# Patient Record
(demographics unavailable — no encounter records)

---

## 2024-10-17 NOTE — HEALTH RISK ASSESSMENT
[Little interest or pleasure doing things] : 1) Little interest or pleasure doing things [2] : 1) Little interest or pleasure doing things for more than half of the days (2) [Feeling down, depressed, or hopeless] : 2) Feeling down, depressed, or hopeless [3] : 2) Feeling down, depressed, or hopeless for nearly every day (3) [PHQ-2 Positive] : PHQ-2 Positive [Nearly Every Day (3)] : 6.) Feeling bad about yourself, or that you are a failure, or have let yourself or your family down? Nearly every day [1/2 of Days or More (2)] : 8.) Moving or speaking so slowly that other people could have noticed, or the opposite, moving or speaking faster than usual? Half the days or more [Not at All (0)] : 9.) Thoughts that you would be off dead or of hurting yourself in some way? Not at all [Moderately Severe] : Severity of Depression is Moderately Severe [Very Difficult] : How difficult have these problems made it for you to do your work, take care of things at home, or get along with people? Very difficult [PHQ-9 Positive] : PHQ-9 Positive [I have developed a follow-up plan documented below in the note.] : I have developed a follow-up plan documented below in the note. [Time Spent: ___ Minutes] : I spent [unfilled] minutes performing a depression screening for this patient. [de-identified] : none [de-identified] : Vascular [de-identified] : pt is not active [de-identified] : fair, no carbs [OAS8Vtrby] : 5 [[id number]] : 19

## 2024-10-17 NOTE — HISTORY OF PRESENT ILLNESS
[de-identified] : 48 F with hypothyroidism and MDD presenting for an acute visit. Noted to have positive PHQ-2. The source of her depression is because she has had to take care of her sick father who has Parkinson's disease. She states she stopped taking levothyroxine because it makes her feel sick and has not been taking sertraline for months (previously taking 50mg daily). She is reporting RUQ pain today. Never went for US abdomen as recommended multiple times.

## 2024-10-17 NOTE — PHYSICAL EXAM
[Normal] : no acute distress, well nourished, well developed and well-appearing [Normal Sclera/Conjunctiva] : normal sclera/conjunctiva [No Respiratory Distress] : no respiratory distress  [de-identified] : TTP in the RUQ [de-identified] : Tearful

## 2024-11-20 NOTE — HISTORY OF PRESENT ILLNESS
[FreeTextEntry1] : spot under left eye  [de-identified] : spot under left eye growing gets irritated   also has small growths around neck, under axilla discoloration on cheeks

## 2024-11-20 NOTE — PHYSICAL EXAM
[FreeTextEntry3] : hyperpigmentation on cheeks brown pedunculated papule under left eye small skin colored papules under arm, around neck

## 2024-11-20 NOTE — ASSESSMENT
[FreeTextEntry1] : Favor SK/large DPN under left eye - referral to plastic surgery (Dr. Mckeon) for removal  other DPNs/skin tags - benign, reassurance, no intervention needed unless irritated  Favor subtle melasma vs early lentigo, cheeks at this point, recommend monitoring as skin lightening creams with hydroquinone may be too irritating if worsening, rtc

## 2024-12-04 NOTE — HEALTH RISK ASSESSMENT
[No] : In the past 12 months have you used drugs other than those required for medical reasons? No [No falls in past year] : Patient reported no falls in the past year [Little interest or pleasure doing things] : 1) Little interest or pleasure doing things [Feeling down, depressed, or hopeless] : 2) Feeling down, depressed, or hopeless [3] : 2) Feeling down, depressed, or hopeless for nearly every day (3) [PHQ-2 Positive] : PHQ-2 Positive [Never] : Never [PHQ-9 Positive] : PHQ-9 Positive [I have developed a follow-up plan documented below in the note.] : I have developed a follow-up plan documented below in the note. [Time Spent: ___ Minutes] : I spent [unfilled] minutes performing a depression screening for this patient. [de-identified] : no [de-identified] : dermatoligist  [de-identified] : poor  [de-identified] : regular  [[id number]] : 6

## 2024-12-04 NOTE — HISTORY OF PRESENT ILLNESS
[FreeTextEntry1] : pt is here for follow up and forms  [de-identified] : 48 F with hypothyroidism and MDD is presenting today for a follow-up visit.  She went for an MRI of the abdomen in November which shows hepatic steatosis in the setting of elevated LFTs.  She is compliant with sertraline 50 mg daily.  PHQ-9 score today is a 10 which is down from 19 last visit.  She does not take levothyroxine despite having hypothyroidism because of side effects. He also reports having a fall a couple of days ago with residual back pain.

## 2025-01-15 NOTE — PHYSICAL EXAM
[TextEntry] : GENERAL: awake, NAD   HEENT: NCAT   NECK: supple, no LAD    CARDIAC: RRR, S1, S2 present   LUNGS: CTA b/l, comfortable respirations on room air   ABD: Soft, NT, ND   EXT: warm, well-perfused, no edema   SKIN: No lesions noted. Dry and warm

## 2025-01-15 NOTE — REVIEW OF SYSTEMS
[TextEntry] : Constitutional, Cardiovascular, Respiratory, Musculoskeletal, Integumentary, Neurological and Heme/Lymph review of systems are otherwise negative except as noted in HPI.

## 2025-01-15 NOTE — HISTORY OF PRESENT ILLNESS
[FreeTextEntry1] : : [id number]  Patient is a 48F with hypothyroidism and MDD, hepatic steatosis, here with initial evaluation for hypothyroidism.  Patient states back in 2001, was told she had her thyroid level in abnormal range. Patient was prescribed LT4 112 mcg qAM but could not tolerate for past years. States she gets exacerbation of emotional lability, anxiety and weight gain. Currently pt is on sertraline for depression and has been helping. Patient otherwise reports clinically euthyroid today.  Was not compliant with LT4 in Sept 2024 when TSH was normal as below. Currently denies taking any medications. Past 8 months patient has no period. Denies hot flashes.  Labs 9/2024- CMP with AST56, ALT 99, . Normal eGFR, TSH 3.69, T3 104 A1c today 5.6%  SHx: Denies toxic habits FHx: No thyroid issues in family

## 2025-01-15 NOTE — ASSESSMENT
[FreeTextEntry1] : Patient is a 48F with hypothyroidism and MDD, hepatic steatosis, here with initial evaluation for hypothyroidism.  # Hypothyroidism - Known thyroid issues in the past, from pregnancy back early 2000 - Reportedly could not tolerate LT4 due to emotional lability and weight gain - Explained that, correcting hypothyroidism could potentially help with weight gain rather, though effect is minimal. Also no evidence about direct emotional impact on LT4, though person to person variability exists - Clinically euthyroid and TSH 3.69 in 9/2024 while not compliant with LT4 - Will check TSH and TPO Ab today. If TSH elevated, may resume LT4 but at a lower dose. If normal TSH, no further endocrine follow-up needed.  # Prediabetes - HbA1c 5.6% today, improved since last year - Advised healthy lifestyle adherence. Pt to follow with pcp  RTC 4 months

## 2025-02-28 NOTE — REVIEW OF SYSTEMS
[Recent Change In Weight] : ~T recent weight change [Joint Pain] : joint pain [Skin Rash] : skin rash [Dizziness] : dizziness [Negative] : Heme/Lymph

## 2025-02-28 NOTE — PLAN
[FreeTextEntry1] :  #HCM - CBC with differential, HIV, Hep C, CMP, hemoglobin A1c, vitamin B12, vitamin D, urine analysis, lipid panel, TFTs all ordered. Patient to be informed of results. - The patient has screened negative for depression and excessive alcohol use. - The patient has never used tobacco products. - The [place] Preventive Service Task Force recommends yearly lung cancer screening with LDCT for people who have a 20 pack-year or more smoking history and smoke now or have quit within the past 15 years and are between 50 and 80 years old. - Blood pressure as taken in the office today is within normal limits (<120/<80). - Colon cancer screening was discussed at today's visit, reports normal study Aug 2024 - EKG performed in the office today is within normal limits. - Breast cancer screening has been discussed at today's visit and mammogram script has been ordered. - Counseled on maintaining a healthy body weight. It has been estimated that up to one-third of cardiovascular disease deaths may be preventable by healthy diet and physical activity.

## 2025-02-28 NOTE — HEALTH RISK ASSESSMENT
[Good] : ~his/her~  mood as  good [No] : No [No falls in past year] : Patient reported no falls in the past year [0] : 2) Feeling down, depressed, or hopeless: Not at all (0) [Never] : Never [NO] : No [Patient reported mammogram was normal] : Patient reported mammogram was normal [Patient reported PAP Smear was normal] : Patient reported PAP Smear was normal [Patient reported colonoscopy was normal] : Patient reported colonoscopy was normal [HIV Test offered] : HIV Test offered [Hepatitis C test offered] : Hepatitis C test offered [With Family] : lives with family [Employed] : employed [] :  [# Of Children ___] : has [unfilled] children [Sexually Active] : sexually active [Feels Safe at Home] : Feels safe at home [Fully functional (bathing, dressing, toileting, transferring, walking, feeding)] : Fully functional (bathing, dressing, toileting, transferring, walking, feeding) [Fully functional (using the telephone, shopping, preparing meals, housekeeping, doing laundry, using] : Fully functional and needs no help or supervision to perform IADLs (using the telephone, shopping, preparing meals, housekeeping, doing laundry, using transportation, managing medications and managing finances) [Smoke Detector] : smoke detector [Carbon Monoxide Detector] : carbon monoxide detector [High Risk Behavior] : no high risk behavior [Sunscreen] : does not use sunscreen [FreeTextEntry1] : Physical and weight management  [PHQ-2 Negative - No further assessment needed] : PHQ-2 Negative - No further assessment needed [de-identified] : None [de-identified] : None [de-identified] : normal [de-identified] : no diet [[id number]] : 0 [Change in mental status noted] : No change in mental status noted [Language] : denies difficulty with language [Behavior] : denies difficulty with behavior [Learning/Retaining New Information] : denies difficulty learning/retaining new information [Handling Complex Tasks] : denies difficulty handling complex tasks [Reasoning] : denies difficulty with reasoning [Spatial Ability and Orientation] : denies difficulty with spatial ability and orientation [Reports changes in hearing] : Reports no changes in hearing [Reports changes in vision] : Reports no changes in vision [Reports normal functional visual acuity (ie: able to read med bottle)] : Reports poor functional visual acuity.  [Reports changes in dental health] : Reports no changes in dental health [Safety elements used in home] : no safety elements used in home [Seat Belt] : does not use seat belt [Travel to Developing Areas] : does not  travel to developing areas [TB Exposure] : is not being exposed to tuberculosis [Caregiver Concerns] : does not have caregiver concerns [MammogramDate] : 01/2024 [PapSmearDate] : 01/2023 [ColonoscopyDate] : 01/2024

## 2025-02-28 NOTE — HISTORY OF PRESENT ILLNESS
[de-identified] : 48 F with hypothyroidism and MDD is presenting today for physical examination and acute visit. She has a multitude of complaints today including worsening vertigo symptoms, right leg heaviness as well as right knee and right hip pain, rash on her lower extremities and wanting to lose weight.  She states the vertigo last for a couple of seconds and there is a room spinning sensation.  She states the right knee and right hip bother her when she climbs stairs.  The rash is not itchy or painful however is only in the lower extremities.  She also wants to lose weight.

## 2025-02-28 NOTE — HEALTH RISK ASSESSMENT
[Good] : ~his/her~  mood as  good [No] : No [No falls in past year] : Patient reported no falls in the past year [0] : 2) Feeling down, depressed, or hopeless: Not at all (0) [Never] : Never [NO] : No [Patient reported mammogram was normal] : Patient reported mammogram was normal [Patient reported PAP Smear was normal] : Patient reported PAP Smear was normal [Patient reported colonoscopy was normal] : Patient reported colonoscopy was normal [HIV Test offered] : HIV Test offered [Hepatitis C test offered] : Hepatitis C test offered [With Family] : lives with family [Employed] : employed [] :  [# Of Children ___] : has [unfilled] children [Sexually Active] : sexually active [Feels Safe at Home] : Feels safe at home [Fully functional (bathing, dressing, toileting, transferring, walking, feeding)] : Fully functional (bathing, dressing, toileting, transferring, walking, feeding) [Fully functional (using the telephone, shopping, preparing meals, housekeeping, doing laundry, using] : Fully functional and needs no help or supervision to perform IADLs (using the telephone, shopping, preparing meals, housekeeping, doing laundry, using transportation, managing medications and managing finances) [Smoke Detector] : smoke detector [Carbon Monoxide Detector] : carbon monoxide detector [High Risk Behavior] : no high risk behavior [Sunscreen] : does not use sunscreen [FreeTextEntry1] : Physical and weight management  [PHQ-2 Negative - No further assessment needed] : PHQ-2 Negative - No further assessment needed [de-identified] : None [de-identified] : None [de-identified] : normal [de-identified] : no diet [[id number]] : 0 [Change in mental status noted] : No change in mental status noted [Language] : denies difficulty with language [Behavior] : denies difficulty with behavior [Learning/Retaining New Information] : denies difficulty learning/retaining new information [Handling Complex Tasks] : denies difficulty handling complex tasks [Reasoning] : denies difficulty with reasoning [Spatial Ability and Orientation] : denies difficulty with spatial ability and orientation [Reports changes in hearing] : Reports no changes in hearing [Reports changes in vision] : Reports no changes in vision [Reports normal functional visual acuity (ie: able to read med bottle)] : Reports poor functional visual acuity.  [Reports changes in dental health] : Reports no changes in dental health [Safety elements used in home] : no safety elements used in home [Seat Belt] : does not use seat belt [Travel to Developing Areas] : does not  travel to developing areas [TB Exposure] : is not being exposed to tuberculosis [Caregiver Concerns] : does not have caregiver concerns [MammogramDate] : 01/2024 [PapSmearDate] : 01/2023 [ColonoscopyDate] : 01/2024

## 2025-02-28 NOTE — COUNSELING
[Potential consequences of obesity discussed] : Potential consequences of obesity discussed [Encouraged to increase physical activity] : Encouraged to increase physical activity [Good understanding] : Patient has a good understanding of disease, goals and obesity follow-up plan [FreeTextEntry4] : 20

## 2025-02-28 NOTE — HISTORY OF PRESENT ILLNESS
[de-identified] : 48 F with hypothyroidism and MDD is presenting today for physical examination and acute visit. She has a multitude of complaints today including worsening vertigo symptoms, right leg heaviness as well as right knee and right hip pain, rash on her lower extremities and wanting to lose weight.  She states the vertigo last for a couple of seconds and there is a room spinning sensation.  She states the right knee and right hip bother her when she climbs stairs.  The rash is not itchy or painful however is only in the lower extremities.  She also wants to lose weight.

## 2025-02-28 NOTE — HISTORY OF PRESENT ILLNESS
[de-identified] : 48 F with hypothyroidism and MDD is presenting today for physical examination and acute visit. She has a multitude of complaints today including worsening vertigo symptoms, right leg heaviness as well as right knee and right hip pain, rash on her lower extremities and wanting to lose weight.  She states the vertigo last for a couple of seconds and there is a room spinning sensation.  She states the right knee and right hip bother her when she climbs stairs.  The rash is not itchy or painful however is only in the lower extremities.  She also wants to lose weight.

## 2025-02-28 NOTE — HEALTH RISK ASSESSMENT
[Good] : ~his/her~  mood as  good [No] : No [No falls in past year] : Patient reported no falls in the past year [0] : 2) Feeling down, depressed, or hopeless: Not at all (0) [Never] : Never [NO] : No [Patient reported mammogram was normal] : Patient reported mammogram was normal [Patient reported PAP Smear was normal] : Patient reported PAP Smear was normal [Patient reported colonoscopy was normal] : Patient reported colonoscopy was normal [HIV Test offered] : HIV Test offered [Hepatitis C test offered] : Hepatitis C test offered [With Family] : lives with family [Employed] : employed [] :  [# Of Children ___] : has [unfilled] children [Sexually Active] : sexually active [Feels Safe at Home] : Feels safe at home [Fully functional (bathing, dressing, toileting, transferring, walking, feeding)] : Fully functional (bathing, dressing, toileting, transferring, walking, feeding) [Fully functional (using the telephone, shopping, preparing meals, housekeeping, doing laundry, using] : Fully functional and needs no help or supervision to perform IADLs (using the telephone, shopping, preparing meals, housekeeping, doing laundry, using transportation, managing medications and managing finances) [Smoke Detector] : smoke detector [Carbon Monoxide Detector] : carbon monoxide detector [High Risk Behavior] : no high risk behavior [Sunscreen] : does not use sunscreen [FreeTextEntry1] : Physical and weight management  [PHQ-2 Negative - No further assessment needed] : PHQ-2 Negative - No further assessment needed [de-identified] : None [de-identified] : None [de-identified] : normal [de-identified] : no diet [VHL2Teljm] : 0 [Change in mental status noted] : No change in mental status noted [Language] : denies difficulty with language [Behavior] : denies difficulty with behavior [Learning/Retaining New Information] : denies difficulty learning/retaining new information [Handling Complex Tasks] : denies difficulty handling complex tasks [Reasoning] : denies difficulty with reasoning [Spatial Ability and Orientation] : denies difficulty with spatial ability and orientation [Reports changes in hearing] : Reports no changes in hearing [Reports changes in vision] : Reports no changes in vision [Reports normal functional visual acuity (ie: able to read med bottle)] : Reports poor functional visual acuity.  [Reports changes in dental health] : Reports no changes in dental health [Safety elements used in home] : no safety elements used in home [Seat Belt] : does not use seat belt [Travel to Developing Areas] : does not  travel to developing areas [TB Exposure] : is not being exposed to tuberculosis [Caregiver Concerns] : does not have caregiver concerns [MammogramDate] : 01/2024 [PapSmearDate] : 01/2023 [ColonoscopyDate] : 01/2024

## 2025-03-03 NOTE — HISTORY OF PRESENT ILLNESS
[de-identified] : 49 year old female referred by Derm Dr. April Long for evaluation of left under eye lesion. On exam lesion c/w Seborrheic keratosis. States lesion fell off since seen by Dr. Long in November. Scratched it off accidentally. Feels that a similar lesion in developing on the right eye lid.

## 2025-03-03 NOTE — CONSULT LETTER
[Dear  ___] : Dear  [unfilled], [Consult Letter:] : I had the pleasure of evaluating your patient, [unfilled]. [Please see my note below.] : Please see my note below. [Consult Closing:] : Thank you very much for allowing me to participate in the care of this patient.  If you have any questions, please do not hesitate to contact me. [Sincerely,] : Sincerely, [FreeTextEntry2] : Dr. April Long [FreeTextEntry3] : April Mckeon MD Facial Plastic & Reconstructive Surgery Department of Otolaryngology [address] [place] [address] [phone]

## 2025-03-03 NOTE — HISTORY OF PRESENT ILLNESS
[de-identified] : 49 year old female referred by Derm Dr. April Long for evaluation of left under eye lesion. On exam lesion c/w Seborrheic keratosis. States lesion fell off since seen by Dr. Long in November. Scratched it off accidentally. Feels that a similar lesion in developing on the right eye lid.

## 2025-03-03 NOTE — CONSULT LETTER
[Dear  ___] : Dear  [unfilled], [Consult Letter:] : I had the pleasure of evaluating your patient, [unfilled]. [Please see my note below.] : Please see my note below. [Consult Closing:] : Thank you very much for allowing me to participate in the care of this patient.  If you have any questions, please do not hesitate to contact me. [Sincerely,] : Sincerely, [FreeTextEntry2] : Dr. April Long [FreeTextEntry3] : April Mckeon MD Facial Plastic & Reconstructive Surgery Department of Otolaryngology [address] [place] [address] [phone]

## 2025-03-03 NOTE — REASON FOR VISIT
[Initial Consultation] : an initial consultation for [FreeTextEntry2] : left under eye lesion [Interpreters_IDNumber] : [id number] [Interpreters_FullName] : Ketan [TWNoteComboBox1] : Qatari

## 2025-03-03 NOTE — REASON FOR VISIT
[Initial Consultation] : an initial consultation for [FreeTextEntry2] : left under eye lesion [Interpreters_IDNumber] : [id number] [Interpreters_FullName] : Ketan [TWNoteComboBox1] : Albanian

## 2025-04-11 NOTE — PHYSICAL EXAM
[Midline] : trachea located in midline position [Normal] : no rashes [Nasal Endoscopy Performed] : nasal endoscopy was performed, see procedure section for findings [] : septum deviated to the right [de-identified] : bilaterally slightly retracted [de-identified] : left under eye dry area/lesion 0.2 cm no SK noted

## 2025-04-11 NOTE — CONSULT LETTER
[Dear  ___] : Dear  [unfilled], [Consult Letter:] : I had the pleasure of evaluating your patient, [unfilled]. [Please see my note below.] : Please see my note below. [Consult Closing:] : Thank you very much for allowing me to participate in the care of this patient.  If you have any questions, please do not hesitate to contact me. [Sincerely,] : Sincerely, [FreeTextEntry2] : Dr. April Long [FreeTextEntry3] : April Mckeon MD Facial Plastic & Reconstructive Surgery Department of Otolaryngology [address] [place] [address] [phone]

## 2025-04-11 NOTE — REASON FOR VISIT
[Subsequent Evaluation] : a subsequent evaluation for [Language [facility] ] : provided by [employer]   [FreeTextEntry2] : left under eye lesion [Interpreters_IDNumber] : [id number] [Interpreters_FullName] : Jones [TWNoteComboBox1] : Samoan

## 2025-04-11 NOTE — ASSESSMENT
[FreeTextEntry1] : 49 year old female with left lower lid SK now fell off at home healing well now reporting nasal congestion refractory to flonase and astelin, though endoscopy shows mild right sided septal deviation not sufficient to describe pt's sx. There is likely an allergic component to sx, would rec eval by allergy.  For skin hyperpigmentation concerns refractory to OTC retinol, rec re-eval by pt's dermatologist dr. Long.   Pt also now reporting decreased L sided hearing. Bilateral ear exams with retraction, audio today with mild hearing loss bilaterally symmetric with retraction of the L TM, likely related to ETD.   - allergy eval - fu with derm - c/w sprays as prescribed

## 2025-04-11 NOTE — PHYSICAL EXAM
[Midline] : trachea located in midline position [Normal] : no rashes [Nasal Endoscopy Performed] : nasal endoscopy was performed, see procedure section for findings [] : septum deviated to the right [de-identified] : bilaterally slightly retracted [de-identified] : left under eye dry area/lesion 0.2 cm no SK noted

## 2025-04-11 NOTE — REASON FOR VISIT
[Subsequent Evaluation] : a subsequent evaluation for [Language Line ] : provided by [employer]   [FreeTextEntry2] : left under eye lesion [Interpreters_IDNumber] : [id number] [Interpreters_FullName] : Jones [TWNoteComboBox1] : [place]

## 2025-04-11 NOTE — CONSULT LETTER
[Dear  ___] : Dear  [unfilled], [Consult Letter:] : I had the pleasure of evaluating your patient, [unfilled]. [Please see my note below.] : Please see my note below. [Consult Closing:] : Thank you very much for allowing me to participate in the care of this patient.  If you have any questions, please do not hesitate to contact me. [Sincerely,] : Sincerely, [FreeTextEntry2] : Dr. April Long [FreeTextEntry3] : April Mckeon MD Facial Plastic & Reconstructive Surgery Department of Otolaryngology [address] [place] [address] [phone]

## 2025-04-11 NOTE — HISTORY OF PRESENT ILLNESS
[de-identified] : 49 year old female presents for 1 month follow-up for nasal obstruction, follow up for hyperpigmentation of skin, and L sided decreased hearing. States that she is unable to breath well through her nose("nose feels clogged") even with consistent use of the nasal spray Flonase and Azelastine. Reports L side is worse than R side. Denies sinus pain/pressure, anterior rhinorrhea, post nasal drip, recent fevers or sinus infections. has good sense of smell. No recent imaging.  For skin hyperpigmentation, has used retinol OTC without improvement. Has H/o Left under eye lesion - Seborrheic keratosis which fell off prior to last visit.   Reports decreased hearing of Left that she noticed it a couple weeks ago.  Patient denies otalgia, otorrhea, recent fevers or ear infections, tinnitus, dizziness, vertigo, headaches related to hearing

## 2025-04-11 NOTE — REVIEW OF SYSTEMS
[As Noted in HPI] : as noted in HPI [TextEntry] : A complete review of >10 systems was performed and all systems were negative except as indicated on the HPI/PMH/PSH.

## 2025-04-11 NOTE — HISTORY OF PRESENT ILLNESS
[de-identified] : 49 year old female presents for 1 month follow-up for nasal obstruction, follow up for hyperpigmentation of skin, and L sided decreased hearing. States that she is unable to breath well through her nose("nose feels clogged") even with consistent use of the nasal spray Flonase and Azelastine. Reports L side is worse than R side. Denies sinus pain/pressure, anterior rhinorrhea, post nasal drip, recent fevers or sinus infections. has good sense of smell. No recent imaging.  For skin hyperpigmentation, has used retinol OTC without improvement. Has H/o Left under eye lesion - Seborrheic keratosis which fell off prior to last visit.   Reports decreased hearing of Left that she noticed it a couple weeks ago.  Patient denies otalgia, otorrhea, recent fevers or ear infections, tinnitus, dizziness, vertigo, headaches related to hearing